# Patient Record
(demographics unavailable — no encounter records)

---

## 2025-02-04 NOTE — ASSESSMENT
[FreeTextEntry1] : 4 yearF with Snoring and ATH on exam.  Discussed snoring vs UARS vs SDB vs CHRISTOPHER.  Discussed that primary snoring is not harmful in and off itself but sleep apnea is different.  Often if we suspect SDB or CHRISTOPHER, we recommend evaluating and treating due to long term risk of quality-of-life issues, learning issues and, in severe cases, heart and lung problems.  Plan for nasal steroid and PSG.   Also with history of speech delay. Today audio is normal   . Recommend continuing with speech services. Spoke to patient in their primary language of French (NP [place])  RTC after PSG

## 2025-02-04 NOTE — REASON FOR VISIT
[Initial Consultation] : an initial consultation for [Mother] : mother [Language Line ] : provided by Language Line   [Speech Delay] : speech delay [Nasal Obstruction] : nasal obstruction [Nasal Discharge] : nasal discharge [Sleep Apnea/ Snoring] : sleep apnea/ snoring [Interpreters_IDNumber] : [id number]

## 2025-02-04 NOTE — HISTORY OF PRESENT ILLNESS
[de-identified] : 2-4-25 4 year F with SDB, nasal congestion, and speech delay  +Nasal congestion nasal steroid use PRN with no relief +Snoring even in character, open mouth breathing, sleeps well Denies daytime tiredness, focus issues Has PSG scheduled in [place] in March  No recent ear infections No otorrhea Passed [facility] Gets speech services with minimal change No hearing concerns No prior audio No recent throat infections No bleeding or anesthesia issues Spoke to patient in their primary language of [place]ian

## 2025-03-03 NOTE — REASON FOR VISIT
[TextEntry] : Ashley Reyes is 4 years old male with history of chronic allergic rhinitis and environmental allergies. From pulmonary and sleep perspective, she has history of snoring, respiratory pauses during sleep and restless sleep. The patient is here for an initial evaluation. The history was obtained from the patient and mother.

## 2025-03-03 NOTE — PHYSICAL EXAM
[TextEntry] : General: Well-developed, well-nourished, no acute distress, well-appearing, active and playful. Eyes: Extra-ocular movements intact, conjunctiva clear. Head: normocephalic, atraumatic. Ear: Right ear: Normal external ear, non-erythematous tympanic membrane Left ear: Normal external ear, non-erythematous tympanic membrane Nose: Right nose: Moderately enlarged and boggy nasal turbinate Left nose: Moderately enlarged and boggy nasal turbinate Throat: Oral mucous membranes moist and pink, no oral lesions, normal dentition and gingiva, tonsils 3+ BL, Mallampati class II. Neck: Supple, trachea midline, no masses. Cardiovascular: Regular rate and rhythm, no murmurs appreciated; capillary refill < 2 second; 2+ radial pulses bilaterally; no edema. Respiratory: No deformities of the chest, symmetric chest rise, breathing non-labored, no retractions, no nasal flaring, no tracheal tug, lungs clear to auscultation BL, good aeration BL through all lung fields, no crackles, no wheezes, no upper airway transmitted sounds. GI: Abdomen soft, nontender, nondistended, normal bowel sounds, no masses, no organomegaly. Lymph: No cervical lymphadenopathy appreciated. Musculoskeletal: Normal muscle tone and strength. Extremities: Warm, well-perfused, no digital clubbing or cyanosis. Skin: Warm, dry, no rashes. Neurology: Awake, alert, and interactive, normal affect, developmentally appropriate.

## 2025-03-03 NOTE — ASSESSMENT
[FreeTextEntry1] : Ashley Reyes is 4 years old male with history of chronic allergic rhinitis and environmental allergies. From pulmonary and sleep perspective, she has history of snoring, respiratory pauses during sleep and restless sleep.  Macey has history of snoring for last severe years. The snoring has gradually worsened, and she has also developed respiratory pauses during sleep. In addition, she is restless during sleep. There symptoms raise concern for CHRISTOPHER and warrant further evaluation.   Plan: 1. I discussed the need of the sleep study with the parent. I discussed what they should expect during a sleep study and reviewed the process. The parent understands and agrees with the sleep study. 2. I will order a sleep study, and the parent will call and schedule the sleep study.  3. I will see her in 2-3 weeks after the sleep study.

## 2025-03-03 NOTE — HISTORY OF PRESENT ILLNESS
[FreeTextEntry1] : Macey has history of snoring for last severe years. The snoring has gradually worsened, and she has also developed respiratory pauses during sleep. In addition, she is restless during sleep.   SLEEP HISTORY:   Sleep Environment:  The patient shares room and bed with the mother. The patient falls asleep there and stays there throughout the night. The patient does not watch electronics one hour before the bedtime. The patient is unable to fall asleep on own and needs a family member to be able to fall asleep.   Sleep Schedule: The patient goes to [facility] at 8-9 PM on the weekdays and 8-9 PM on the weekends. It takes patient 30 minutes to fall asleep. After falling asleep, the patient stays asleep throughout the night. The patient wakes up at 7-8 AM on the weekdays and 7-8 AM on the weekends. The patient wakes up without difficulty and wakes up on its own.    Sleep Symptoms: Snoring occurs and occurs every night 7 nights a week. It is loud and can be heard outside the room with the door closed. There is history snorting, gasping, and intermittent breathing pauses lasting for several seconds. Additional symptoms: mouth breathing, hyperextended neck during sleep, dry mouth upon awakening, restless sleep. Pertinent negatives: confusional arousals, nightmares, night terrors, sleeptalking, sleepwalking, bed wetting. Symptoms of narcolepsy: none.    Daytime Symptoms: The patient's mood is often irritable and doesn't complain of headache in the morning. There is no history of daytime hyperactivity and behavioral issues. There is no history of excessive daytime sleepiness. The patient does not take nap.    RESPIRATORY SYMPTOMS: There is no history of cough, wheezing and difficulty breathing. There is no history of cough and choking with feeding. There is no history of asthma or recurrent pneumonias.   ALLERGY HISTORY: There is history of seasonal allergies during summer. There is history of itchy or watery nose and eyes. There is history of intermittent nasal congestion. She does not take any medicine.    OTHER SYMPTOMS: The patient is feeding, growing and developing well. There is no history of reflux/GERD, FTT, developmental delay, seizure or neurological disorder.   NEONATOLOGY HISTORY: The patient was born at 40 weeks via . There was no complication during pregnancy and after delivery.   PAST MEDICAL & SURGICAL HISTROY: There is no history of adenotonsillectomy.    FAMILY HISTORY: There is no family history of CHRISTOPHER, RLS and narcolepsy. There is no family history of SIDS.  SOCIAL HISTORY: The patient lives with the mother, maternal uncle and aunt. There is no smoke or pet expsoure at home.

## 2025-06-02 NOTE — ASSESSMENT
[FreeTextEntry1] : Ashley Reyes is 4 years old male with history of chronic allergic rhinitis and environmental allergies. From pulmonary and sleep perspective, she has history of snoring, respiratory pauses during sleep and restless sleep.  Macey continues to have snoring, witnessed apneas during sleep and non-restorative sleep. She underwent a sleep study which showed an AHI of 6.2 events per hour. The obstructive index of 2.5 events per hour and central apnea index of 3.8 events per hour. The sleep apnea was associated with moderate oxygen desaturations with an SpO2 salome of 86%. There was no non-apneic hypoxemia or sleep related hypoventilation. There were PLMS with a PLIM of 2.5 per hour which is within normal limits. There was sleep fragmentation with an elevated arousal index of 16.1 per hour.   Plan: 1. I discussed the results of the sleep study with the mother in detail which showed moderate degree of sleep apnea with associated moderate hypoxemia and sleep fragmentation.  2. I also discussed the acute and chronic complications associated with untreated CHRISTOPHER in children.  3. I also discussed treatment options for CHRISTOPHER in children. 4. In my opinion she will benefit from adenoidectomy and/or tonsillectomy. Therefore, I will refer her back to Dr Moore to discuss adenoidectomy and/or tonsillectomy.  5. She does not have any other pulmonary or sleep related disorder and therefore I will see her as needed.

## 2025-06-02 NOTE — RESULTS/DATA
[TextEntry] : Date of Study: 05/14/2025  TST: 503 SE: 88 REM: 21 AI: 16.1 AHI: 6.2 oAHI: 1.3  SpO2 Adam: 86 Max CO2: 44  TS50: 0 PLMI: 2.5

## 2025-06-02 NOTE — REASON FOR VISIT
[TextEntry] : Ashley Reyes is 4 years old male with history of chronic allergic rhinitis and environmental allergies. From pulmonary and sleep perspective, she has history of snoring, respiratory pauses during sleep and restless sleep. The patient is here for a routine follow up after the sleep study and the last clinic visit was on 03/03/2025. The history was obtained from the patient and mother.

## 2025-06-02 NOTE — END OF VISIT
GENERAL OFFICE POLICIES      Telephone Calls: Messages will be answered within 1-2 business days, unless the provider is out of the office.  If it is urgent a covering provider will answer. (this does not include Medication refills).    MyChart:  We recommend all patients sign up for [facility].  Through this portal you can see your lab results, request refills, schedule appointments, pay your bill and send messages to the office.   [facility] messages will be answered within 1-2 business days unless the provider is out of the office.  For urgent matters, please call the office.  Appointments:  All appointments must be scheduled.  We ask all patients to schedule their next follow up appointment before they leave the office to make sure you will be able to be seen before you run out of medications.  24 hours notice is required to cancel or reschedule an appointment to avoid being marked as a no show.  You may be dismissed from the practice after 3 no shows.    LATE for Appointment: If you are 15 or more minutes late for your appointment, you may be asked to reschedule.  MA/LAB APPTS: Must be scheduled, cannot accept walk in lab visits.  We only draw labs for patients established in our office.  We only do injections for medications ordered by our office.  Acute Sick Visits:  Nothing other than acute complaint will be addressed at this visit.  TRADITIONAL MEDICARE  DOES NOT COVER PHYSICALS  MEDICARE WELLNESS VISITS: These are NOT physicals but the free annual visit offered by Medicare to discuss wellness issues. Medication refills, checkups, etc. will not be addressed during this visit.  Medication Refills: Refills are handled electronically so please contact your pharmacy for medication refills even if current refills have been exhausted. If you are on a controlled medication you will be referred to a specialist (pain specialist, psychiatry, etc).   Forms: There is a $35 fee to fill out FMLA/Disability paperwork, payable 
[FreeTextEntry3] : All information documented by staff members, review of systems, past medical history, family history, social history, surgical history, medications, allergies, immunizations and vital signs were reviewed. I obtained the history and examined the patient. I reviewed the chart for pertinent history, lab results, radiological images and procedures. The assessment and plan was discussed with the family.  Code selected for this visit is based on time. The total time spent performing E/M services today is 30 minutes. Time includes preparing to see patient, reviewing diagnostic studies and records, direct face-to-face visit, completing orders, medication reconciliation, prescription management, and care coordination. Time spent documenting clinical information in the record is also included. Time reported does not include any separately reported service or time.

## 2025-06-02 NOTE — HISTORY OF PRESENT ILLNESS
[FreeTextEntry1] : Macey continues to have snoring, witnessed apneas during sleep and non-restorative sleep. She underwent a sleep study which showed an AHI of 6.2 events per hour. The obstructive index of 2.5 events per hour and central apnea index of 3.8 events per hour. The sleep apnea was associated with moderate oxygen desaturations with an SpO2 salome of 86%. There was no non-apneic hypoxemia or sleep related hypoventilation. There were PLMS with a PLIM of 2.5 per hour which is within normal limits. There was sleep fragmentation with an elevated arousal index of 16.1 per hour.   SLEEP HISTORY:  Sleep Environment: The patient shares room and bed with the mother. The patient falls asleep there and stays there throughout the night. The patient does not watch electronics one hour before the bedtime. The patient is unable to fall asleep on own and needs a family member to be able to fall asleep.  Sleep Schedule: The patient goes to [facility] at 8-9 PM on the weekdays and 8-9 PM on the weekends. It takes patient 30 minutes to fall asleep. After falling asleep, the patient stays asleep throughout the night. The patient wakes up at 7-8 AM on the weekdays and 7-8 AM on the weekends. The patient wakes up without difficulty and wakes up on its own.  Sleep Symptoms: Snoring occurs and occurs every night 7 nights a week. It is loud and can be heard outside the room with the door closed. There is history snorting, gasping, and intermittent breathing pauses lasting for several seconds. Additional symptoms: mouth breathing, hyperextended neck during sleep, dry mouth upon awakening, restless sleep. Pertinent negatives: confusional arousals, nightmares, night terrors, sleeptalking, sleepwalking, bed wetting. Symptoms of narcolepsy: none.  Daytime Symptoms: The patient's mood is often irritable and doesn't complain of headache in the morning. There is no history of daytime hyperactivity and behavioral issues. There is no history of excessive daytime sleepiness. The patient does not take nap.  RESPIRATORY SYMPTOMS: There is no history of cough, wheezing and difficulty breathing. There is no history of cough and choking with feeding. There is no history of asthma or recurrent pneumonias.  ALLERGY HISTORY: There is history of seasonal allergies during summer. There is history of itchy or watery nose and eyes. There is history of intermittent nasal congestion. She does not take any medicine.  OTHER SYMPTOMS: The patient is feeding, growing and developing well. There is no history of reflux/GERD, FTT, developmental delay, seizure or neurological disorder.  NEONATOLOGY HISTORY: The patient was born at 40 weeks via . There was no complication during pregnancy and after delivery.  PAST MEDICAL & SURGICAL HISTROY: There is no history of adenotonsillectomy.  FAMILY HISTORY: There is no family history of CHRISTOPHER, RLS and narcolepsy. There is no family history of SIDS.  SOCIAL HISTORY: The patient lives with the mother, maternal uncle and aunt. There is no smoke or pet expsoure at home.